# Patient Record
Sex: MALE | Race: WHITE | URBAN - METROPOLITAN AREA
[De-identification: names, ages, dates, MRNs, and addresses within clinical notes are randomized per-mention and may not be internally consistent; named-entity substitution may affect disease eponyms.]

---

## 2018-06-11 ENCOUNTER — APPOINTMENT (OUTPATIENT)
Dept: GENERAL RADIOLOGY | Facility: CLINIC | Age: 38
End: 2018-06-11
Attending: PHYSICIAN ASSISTANT
Payer: COMMERCIAL

## 2018-06-11 ENCOUNTER — HOSPITAL ENCOUNTER (EMERGENCY)
Facility: CLINIC | Age: 38
Discharge: HOME | End: 2018-06-11
Attending: PHYSICIAN ASSISTANT | Admitting: PHYSICIAN ASSISTANT
Payer: COMMERCIAL

## 2018-06-11 VITALS
HEIGHT: 69 IN | RESPIRATION RATE: 22 BRPM | SYSTOLIC BLOOD PRESSURE: 147 MMHG | TEMPERATURE: 98.7 F | HEART RATE: 107 BPM | BODY MASS INDEX: 20.14 KG/M2 | OXYGEN SATURATION: 96 % | DIASTOLIC BLOOD PRESSURE: 106 MMHG | WEIGHT: 136 LBS
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
## 2018-06-11 DIAGNOSIS — R07.81 RIB PAIN ON LEFT SIDE: ICD-10-CM

## 2018-06-11 PROCEDURE — 99284 EMERGENCY DEPT VISIT MOD MDM: CPT | Mod: Z6 | Performed by: PHYSICIAN ASSISTANT

## 2018-06-11 PROCEDURE — 71101 X-RAY EXAM UNILAT RIBS/CHEST: CPT | Mod: LT

## 2018-06-11 PROCEDURE — 99284 EMERGENCY DEPT VISIT MOD MDM: CPT | Performed by: PHYSICIAN ASSISTANT

## 2018-06-11 RX ORDER — HYDROCODONE BITARTRATE AND ACETAMINOPHEN 5; 325 MG/1; MG/1
1 TABLET ORAL EVERY 6 HOURS PRN
Qty: 6 TABLET | Refills: 0 | Status: SHIPPED | OUTPATIENT
Start: 2018-06-11 | End: 2018-10-10

## 2018-06-11 NOTE — ED PROVIDER NOTES
History     Chief Complaint   Patient presents with     Fall     patient fell on  old record player  during night  when he woke up and was disoriented  patient  pain on left side  fell on friday night       FLACO Alves is a 38 year old male who presents with complaints of left-sided posterior rib pain since he fell 3 days ago.  Patient states he got out of bed Friday night and tripped over something in his room and subsequently landed against a cabinet against his left posterior ribs.  Patient states he immediately developed pain across his posterior ribs that has persisted since.  His rib pain is worse with deep breathing, movement, and palpation.  He denies any head injury or LOC from the fall.  Patient does not take any blood thinners.  Denies fevers, chills, shortness of breath, nausea, vomiting, abdominal pain, shortness of breath, urinary symptoms, hematuria, midline neck or back pain, or flank pain.      Problem List:    There are no active problems to display for this patient.       Past Medical History:    No past medical history on file.    Past Surgical History:    No past surgical history on file.    Family History:    Family History   Problem Relation Age of Onset     Arrhythmia Father      DIABETES Maternal Grandmother      Breast Cancer Paternal Grandmother      Other Cancer Paternal Grandfather      lung ca       Social History:  Marital Status:  Single [1]  Social History   Substance Use Topics     Smoking status: Current Every Day Smoker     Packs/day: 0.60     Years: 17.00     Types: Cigarettes     Smokeless tobacco: Never Used      Comment: started at age 19     Alcohol use 0.0 oz/week     0 Standard drinks or equivalent per week      Comment: 2-3 beers daily        Medications:      diphenhydrAMINE-acetaminophen (TYLENOL PM)  MG tablet   HYDROcodone-acetaminophen (NORCO) 5-325 MG per tablet   ibuprofen 200 MG capsule         Review of Systems   Constitutional: Negative.   "  Respiratory: Negative.    Cardiovascular: Negative.         Left-sided posterior rib pain   Gastrointestinal: Negative.    Genitourinary: Negative.    Musculoskeletal: Negative.    Skin: Negative.    Neurological: Negative.    All other systems reviewed and are negative.      Physical Exam   BP: 135/90  Pulse: 115  Temp: 98.7  F (37.1  C)  Resp: 18  Height: 175.3 cm (5' 9\")  Weight: 61.7 kg (136 lb)  SpO2: 96 %      Physical Exam   Constitutional: He is oriented to person, place, and time. He appears well-developed and well-nourished.  Non-toxic appearance. No distress.   HENT:   Head: Normocephalic and atraumatic. Head is without raccoon's eyes, without Botello's sign, without abrasion, without contusion and without laceration.   Nose: Nose normal.   Eyes: Conjunctivae and EOM are normal. Pupils are equal, round, and reactive to light.   Neck: Normal range of motion, full passive range of motion without pain and phonation normal. Neck supple. No spinous process tenderness and no muscular tenderness present. No edema and normal range of motion present.   Cardiovascular: Normal rate, regular rhythm, normal heart sounds and intact distal pulses.    No murmur heard.  Pulmonary/Chest: Effort normal and breath sounds normal. No accessory muscle usage. No respiratory distress. He has no decreased breath sounds. He has no wheezes. He has no rhonchi. He has no rales. He exhibits tenderness. He exhibits no laceration, no crepitus, no edema, no deformity, no swelling and no retraction.   Tenderness overlying left posterior ribs.  No ecchymosis or overlying signs of trauma.   Abdominal: Soft. He exhibits no distension. There is no tenderness. There is no rigidity, no rebound and no guarding.   Musculoskeletal: Normal range of motion.        Right shoulder: Normal.        Left shoulder: Normal. He exhibits normal range of motion, no bony tenderness, no swelling and no deformity.        Cervical back: Normal.   Neurological: He " is alert and oriented to person, place, and time.   Skin: Skin is warm and dry. No abrasion, no bruising and no ecchymosis noted. No erythema.       ED Course     ED Course     Procedures      No results found for this or any previous visit (from the past 24 hour(s)).    Medications - No data to display     Results for orders placed or performed during the hospital encounter of 06/11/18   Ribs XR, unilat 3 views + PA chest,  left    Narrative    CHEST AND LEFT RIBS THREE VIEWS   6/11/2018 3:10 PM    HISTORY:  Fall against cabinet, tenderness along left posterior  inferior ribs.     COMPARISON:  Chest radiographs on 2/22/2011.    FINDINGS:  The heart size is normal. No mediastinal pathology is seen.  The lungs are clear. The pulmonary vasculature is normal. No  pneumothorax or pleural effusion is seen. No rib fracture or other  chest wall pathology is identified. I see no definite change since the  previous examination.       Impression    IMPRESSION:  Unremarkable chest and left ribs.     MICHAEL HERRERA MD       Assessments & Plan (with Medical Decision Making)     Pt is a 38 year old male who presents with complaints of left-sided posterior rib pain since he fell 3 days ago.  Patient states he got out of bed Friday night and tripped over something in his room and subsequently landed against a cabinet against his left posterior ribs.  Patient states he immediately developed pain across his posterior ribs that has persisted since.  His rib pain is worse with deep breathing, movement, and palpation.  He denies any head injury or LOC from the fall.  Patient does not take any blood thinners.  Denies fevers, chills, shortness of breath, nausea, vomiting, abdominal pain, shortness of breath, urinary symptoms, hematuria, midline neck or back pain, or flank pain.  Pt is afebrile on arrival.  Exam as above.  X-rays of chest and left ribs were negative for rib fracture, pneumothorax, or pleural effusion.  Discussed  results with patient.  Encouraged symptomatic treatments at home.  Return precautions were reviewed.  Hand-outs were provided.    Patient was sent with Norco #6 and was instructed to follow-up with PCP if no improvement in 5-7 days for continued care and management or sooner if new or worsening symptoms.  He is to return to the ED for persistent and/or worsening symptoms.  Patient expressed understanding of the diagnosis and plan and was discharged home in good condition.    I have reviewed the nursing notes.    I have reviewed the findings, diagnosis, plan and need for follow up with the patient.    Discharge Medication List as of 6/11/2018  4:53 PM      START taking these medications    Details   HYDROcodone-acetaminophen (NORCO) 5-325 MG per tablet Take 1 tablet by mouth every 6 hours as needed for severe pain, Disp-6 tablet, R-0, Local Print             Final diagnoses:   Fall, initial encounter   Rib pain on left side       6/11/2018   [facility] EMERGENCY DEPARTMENT     Mariola Mora PA-C  06/12/18 1528

## 2018-06-11 NOTE — ED AVS SNAPSHOT
[facility] Emergency Department    [address] [address]    Phone:  [phone]    Fax:  [phone]                                       Eric Alves   MRN: [id number]    Department:  [facility] Emergency Department   Date of Visit:  6/11/2018           After Visit Summary Signature Page     I have received my discharge instructions, and my questions have been answered. I have discussed any challenges I see with this plan with the nurse or doctor.    ..........................................................................................................................................  Patient/Patient Representative Signature      ..........................................................................................................................................  Patient Representative Print Name and Relationship to Patient    ..................................................               ................................................  Date                                            Time    ..........................................................................................................................................  Reviewed by Signature/Title    ...................................................              ..............................................  Date                                                            Time

## 2018-06-11 NOTE — ED AVS SNAPSHOT
[facility] Emergency Department    [address] [address]    Phone:  [phone]    Fax:  [phone]                                       Eric Alves   MRN: [id number]    Department:  [facility] Emergency Department   Date of Visit:  6/11/2018           Patient Information     Date Of Birth          1980        Your diagnoses for this visit were:     Fall, initial encounter     Rib pain on left side        You were seen by Mariola Mora PA-C.      Follow-up Information     Call [facility].    Specialty:  Family Practice    Why:  For follow-up    Contact information:    [address] [address] [phone]    Additional information:    The medical center is located at   [address] [place] [address] (between [address]   [address] in [place], [place] miles north   of [place]).        Follow up with [facility] Emergency Department.    Specialty:  EMERGENCY MEDICINE    Why:  As needed, If symptoms worsen    Contact information:    [address] [address] [phone]    Additional information:    The medical center is located at   [address] [place] [address] (between [address]   [address] [place] [address], four miles north   of [place]).      Discharge References/Attachments     RIB CONTUSION OR MINOR FRACTURE (ENGLISH)      24 Hour Appointment Hotline       To make an appointment at any [facility], call [phone] ([phone]). If you don't have a family doctor or clinic, we will help you find one. [facility] are conveniently located to serve the needs of you and your family.             Review of your medicines      START taking        Dose / Directions Last dose taken    HYDROcodone-acetaminophen 5-325 MG per tablet   Commonly known as:  NORCO   Dose:  1 tablet   Quantity:  6 tablet        Take 1 tablet by mouth every 6 hours as needed for severe pain   Refills:  0          Our records show that  you are taking the medicines listed below. If these are incorrect, please call your family doctor or clinic.        Dose / Directions Last dose taken    diphenhydrAMINE-acetaminophen  MG tablet   Commonly known as:  TYLENOL PM   Dose:  1 tablet        Take 1 tablet by mouth nightly as needed for sleep   Refills:  0        ibuprofen 200 MG capsule   Dose:  400 mg        Take 400 mg by mouth every 4 hours as needed for fever (Usually takes 10 tabs/week)   Refills:  0                Information about OPIOIDS     PRESCRIPTION OPIOIDS: WHAT YOU NEED TO KNOW   You have a prescription for an opioid (narcotic) pain medicine. Opioids can cause addiction. If you have a history of chemical dependency of any type, you are at a higher risk of becoming addicted to opioids. Only take this medicine after all other options have been tried. Take it for as short a time and as few doses as possible.     Do not:    Drive. If you drive while taking these medicines, you could be arrested for driving under the influence (DUI).    Operate heavy machinery    Do any other dangerous activities while taking these medicines.     Drink any alcohol while taking these medicines.      Take with any other medicines that contain acetaminophen. Read all labels carefully. Look for the word  acetaminophen  or  Tylenol.  Ask your pharmacist if you have questions or are unsure.    Store your pills in a secure place, locked if possible. We will not replace any lost or stolen medicine. If you don t finish your medicine, please throw away (dispose) as directed by your pharmacist. The [place] Pollution Control Agency has more information about safe disposal: https://www.pca.[facility].mn.us/living-green/managing-unwanted-medications    All opioids tend to cause constipation. Drink plenty of water and eat foods that have a lot of fiber, such as fruits, vegetables, prune juice, apple juice and high-fiber cereal. Take a laxative (Miralax, milk of magnesia,  Eileen, Senna) if you don t move your bowels at least every other day.         Prescriptions were sent or printed at these locations (1 Prescription)                   [facility] [place] - [place], [place] - [address]   [address] [address]    Telephone:  [phone]   Fax:  [phone]   Hours:                  Printed at Department/Unit printer (1 of 1)         HYDROcodone-acetaminophen (NORCO) 5-325 MG per tablet                Procedures and tests performed during your visit     Ribs XR, unilat 3 views + PA chest,  left      Orders Needing Specimen Collection     None      Pending Results     No orders found from 6/9/2018 to 6/12/2018.            Pending Culture Results     No orders found from 6/9/2018 to 6/12/2018.            Pending Results Instructions     If you had any lab results that were not finalized at the time of your Discharge, you can call the ED Lab Result RN at [phone]. You will be contacted by this team for any positive Lab results or changes in treatment. The nurses are available 7 days a week from 10A to 6:30P.  You can leave a message 24 hours per day and they will return your call.        Test Results From Your Hospital Stay        6/11/2018  3:20 PM      Narrative     CHEST AND LEFT RIBS THREE VIEWS   6/11/2018 3:10 PM    HISTORY:  Fall against cabinet, tenderness along left posterior  inferior ribs.     COMPARISON:  Chest radiographs on 2/22/2011.    FINDINGS:  The heart size is normal. No mediastinal pathology is seen.  The lungs are clear. The pulmonary vasculature is normal. No  pneumothorax or pleural effusion is seen. No rib fracture or other  chest wall pathology is identified. I see no definite change since the  previous examination.         Impression     IMPRESSION:  Unremarkable chest and left ribs.     MICHAEL HERRERA MD                Thank you for choosing [facility]       Thank you for choosing [facility] for your care. Our goal is always to  "provide you with excellent care. Hearing back from our patients is one way we can continue to improve our services. Please take a few minutes to complete the written survey that you may receive in the mail after you visit with us. Thank you!        [employer] Information     [employer] lets you send messages to your doctor, view your test results, renew your prescriptions, schedule appointments and more. To sign up, go to [email or web address] . Click on \"Log in\" on the left side of the screen, which will take you to the Welcome page. Then click on \"Sign up Now\" on the right side of the page.     You will be asked to enter the access code listed below, as well as some personal information. Please follow the directions to create your username and password.     Your access code is: [email or web address]  Expires: 2018  4:53 PM     Your access code will  in 90 days. If you need help or a new code, please call your [place] clinic or [phone].        Care EveryWhere ID     This is your Care EveryWhere ID. This could be used by other organizations to access your [place] medical records  [id number]        Equal Access to Services     ALLEGRA SALGADO : Hadii jessica Aviles, waluigida carlee, qajono kaalmada yaritza, kenna gomez. So [facility] [phone].    ATENCIÓN: Si habla español, tiene a chacon disposición servicios gratuitos de asistencia lingüística. Annie al [phone].    We comply with applicable federal civil rights laws and [place] laws. We do not discriminate on the basis of race, color, national origin, age, disability, sex, sexual orientation, or gender identity.            After Visit Summary       This is your record. Keep this with you and show to your community pharmacist(s) and doctor(s) at your next visit.                  "

## 2018-06-11 NOTE — ED NOTES
Pt fell backwards hitting his left rear chest area, no bruising, lung sounds clear. States having pain with inspiration.

## 2018-06-12 ASSESSMENT — ENCOUNTER SYMPTOMS
CARDIOVASCULAR NEGATIVE: 1
GASTROINTESTINAL NEGATIVE: 1
MUSCULOSKELETAL NEGATIVE: 1
NEUROLOGICAL NEGATIVE: 1
RESPIRATORY NEGATIVE: 1
CONSTITUTIONAL NEGATIVE: 1

## 2018-10-10 ENCOUNTER — RADIANT APPOINTMENT (OUTPATIENT)
Dept: GENERAL RADIOLOGY | Facility: CLINIC | Age: 38
End: 2018-10-10
Attending: FAMILY MEDICINE
Payer: COMMERCIAL

## 2018-10-10 ENCOUNTER — OFFICE VISIT (OUTPATIENT)
Dept: FAMILY MEDICINE | Facility: CLINIC | Age: 38
End: 2018-10-10
Payer: COMMERCIAL

## 2018-10-10 VITALS
OXYGEN SATURATION: 95 % | RESPIRATION RATE: 16 BRPM | HEART RATE: 127 BPM | TEMPERATURE: 97.7 F | SYSTOLIC BLOOD PRESSURE: 148 MMHG | DIASTOLIC BLOOD PRESSURE: 94 MMHG | BODY MASS INDEX: 19.94 KG/M2 | WEIGHT: 135 LBS

## 2018-10-10 DIAGNOSIS — R05.9 COUGH: Primary | ICD-10-CM

## 2018-10-10 DIAGNOSIS — R05.9 COUGH: ICD-10-CM

## 2018-10-10 PROCEDURE — 99213 OFFICE O/P EST LOW 20 MIN: CPT | Performed by: FAMILY MEDICINE

## 2018-10-10 PROCEDURE — 71046 X-RAY EXAM CHEST 2 VIEWS: CPT | Mod: FY

## 2018-10-10 ASSESSMENT — PAIN SCALES - GENERAL: PAINLEVEL: NO PAIN (0)

## 2018-10-10 NOTE — PROGRESS NOTES
"  SUBJECTIVE:   Eric Alves is a 38 year old male who presents to clinic today for the following health issues:      1.) Cough  - Fever, feels hot and sweaty  - Coughing  - Feels more SOB, doesn't feel like his air capacity is where it should be    s :Eric Alves is a 38 year old male with cough for a few weeks.  Has roommate with similar sx.     No cp.  Some sob.  No LE edema or rash or  Fever here.     No travel.     No gi/gu issues    Not eating as much.  Drinking fluids.    Problem list, med list, additional histories reviewed and updated, as indicated.      O:BP (!) 148/94 (BP Location: Right arm, Patient Position: Chair, Cuff Size: Adult Regular)  Pulse 127  Temp 97.7  F (36.5  C) (Tympanic)  Resp 16  Wt 135 lb (61.2 kg)  SpO2 95%  BMI 19.94 kg/m2  GEN: Alert and oriented, in no acute distress  CV: tachy, regular.  \"I get really nervous at doctor's office\"  No murmur.    RESP: lungs clear bilaterally, good effort  EXT: no edema or lesions noted in lower extremities  ABD: nontender.  No distention or mass appreciated.  ENT: oropharynx clear, no exudate or palate/tonsil asymmetry  Neck: neck supple without mass or lymphadenopathy    Cxr: normal    A: uri, likely viral    P : supportive cares.  F/u prn.              "

## 2018-10-10 NOTE — MR AVS SNAPSHOT
After Visit Summary   10/10/2018    Eric Alves    MRN: [id number]           Patient Information     Date Of Birth          1980        Visit Information        Provider Department      10/10/2018 2:40 PM Daniel Castillo MD [facility]        Today's Diagnoses     Cough    -  1       Follow-ups after your visit        Follow-up notes from your care team     Return if symptoms worsen or fail to improve.      Who to contact     If you have questions or need follow up information about today's clinic visit or your schedule please contact [facility] directly at [phone].  Normal or non-critical lab and imaging results will be communicated to you by MyChart, letter or phone within 4 business days after the clinic has received the results. If you do not hear from us within 7 days, please contact the clinic through MyChart or phone. If you have a critical or abnormal lab result, we will notify you by phone as soon as possible.  Submit refill requests through [employer] or call your pharmacy and they will forward the refill request to us. Please allow 3 business days for your refill to be completed.          Additional Information About Your Visit        Care EveryWhere ID     This is your Care EveryWhere ID. This could be used by other organizations to access your [place] medical records  [id number]        Your Vitals Were     Pulse Temperature Respirations Pulse Oximetry BMI (Body Mass Index)       127 97.7  F (36.5  C) (Tympanic) 16 95% 19.94 kg/m2        Blood Pressure from Last 3 Encounters:   10/10/18 (!) 148/94   06/11/18 (!) 147/106   10/07/16 120/80    Weight from Last 3 Encounters:   10/10/18 135 lb (61.2 kg)   06/11/18 136 lb (61.7 kg)   10/07/16 136 lb 3.2 oz (61.8 kg)               Primary Care Provider Fax #    Physician No Ref-Primary [phone]       No address on file        Equal Access to Services     ALLEGRA SALGADO AH: Stanley dalal  Traci, rito dejakeha, anthony kalauren vigil, kenna dianain hayaan arethacarmen daniellabill lainagordon patricia. So [facility] [phone].    ATENCIÓN: Si hawa jung, tiene a chacon disposición servicios gratuitos de asistencia lingüística. Annie al [phone].    We comply with applicable federal civil rights laws and [place] laws. We do not discriminate on the basis of race, color, national origin, age, disability, sex, sexual orientation, or gender identity.            Thank you!     Thank you for choosing [facility]  for your care. Our goal is always to provide you with excellent care. Hearing back from our patients is one way we can continue to improve our services. Please take a few minutes to complete the written survey that you may receive in the mail after your visit with us. Thank you!             Your Updated Medication List - Protect others around you: Learn how to safely use, store and throw away your medicines at www.disposemymeds.org.          This list is accurate as of 10/10/18  3:11 PM.  Always use your most recent med list.                   Brand Name Dispense Instructions for use Diagnosis    diphenhydrAMINE-acetaminophen  MG tablet    TYLENOL PM     Take 1 tablet by mouth nightly as needed for sleep        ibuprofen 200 MG capsule      Take 400 mg by mouth every 4 hours as needed for fever (Usually takes 10 tabs/week)

## 2018-10-10 NOTE — NURSING NOTE
"Chief Complaint   Patient presents with     Cough       Initial BP (!) 148/94 (BP Location: Right arm, Patient Position: Chair, Cuff Size: Adult Regular)  Pulse 127  Temp 97.7  F (36.5  C) (Tympanic)  Resp 16  Wt 135 lb (61.2 kg)  SpO2 95%  BMI 19.94 kg/m2 Estimated body mass index is 19.94 kg/(m^2) as calculated from the following:    Height as of 6/11/18: 5' 9\" (1.753 m).    Weight as of this encounter: 135 lb (61.2 kg).    Patient presents to the clinic using No DME    Health Maintenance that is potentially due pending provider review:  NONE    Camryn Ny MA  2:51 PM 10/10/2018  .        "